# Patient Record
Sex: MALE | Race: WHITE | NOT HISPANIC OR LATINO | Employment: STUDENT | URBAN - METROPOLITAN AREA
[De-identification: names, ages, dates, MRNs, and addresses within clinical notes are randomized per-mention and may not be internally consistent; named-entity substitution may affect disease eponyms.]

---

## 2021-05-27 ENCOUNTER — RECORDS - HEALTHEAST (OUTPATIENT)
Dept: ADMINISTRATIVE | Facility: CLINIC | Age: 21
End: 2021-05-27

## 2022-12-28 ENCOUNTER — HOSPITAL ENCOUNTER (EMERGENCY)
Facility: HOSPITAL | Age: 22
Discharge: HOME | End: 2022-12-28
Attending: STUDENT IN AN ORGANIZED HEALTH CARE EDUCATION/TRAINING PROGRAM | Admitting: STUDENT IN AN ORGANIZED HEALTH CARE EDUCATION/TRAINING PROGRAM
Payer: COMMERCIAL

## 2022-12-28 ENCOUNTER — APPOINTMENT (OUTPATIENT)
Dept: CT IMAGING | Facility: HOSPITAL | Age: 22
End: 2022-12-28
Attending: STUDENT IN AN ORGANIZED HEALTH CARE EDUCATION/TRAINING PROGRAM
Payer: COMMERCIAL

## 2022-12-28 VITALS
TEMPERATURE: 98.7 F | HEART RATE: 65 BPM | WEIGHT: 160 LBS | RESPIRATION RATE: 16 BRPM | OXYGEN SATURATION: 97 % | SYSTOLIC BLOOD PRESSURE: 106 MMHG | HEIGHT: 70 IN | BODY MASS INDEX: 22.9 KG/M2 | DIASTOLIC BLOOD PRESSURE: 57 MMHG

## 2022-12-28 DIAGNOSIS — S09.90XA INJURY OF HEAD, INITIAL ENCOUNTER: ICD-10-CM

## 2022-12-28 PROCEDURE — 250N000013 HC RX MED GY IP 250 OP 250 PS 637: Performed by: STUDENT IN AN ORGANIZED HEALTH CARE EDUCATION/TRAINING PROGRAM

## 2022-12-28 PROCEDURE — 250N000011 HC RX IP 250 OP 636: Performed by: STUDENT IN AN ORGANIZED HEALTH CARE EDUCATION/TRAINING PROGRAM

## 2022-12-28 PROCEDURE — 70450 CT HEAD/BRAIN W/O DYE: CPT

## 2022-12-28 PROCEDURE — 99284 EMERGENCY DEPT VISIT MOD MDM: CPT | Mod: 25

## 2022-12-28 PROCEDURE — 72125 CT NECK SPINE W/O DYE: CPT

## 2022-12-28 RX ORDER — ONDANSETRON 4 MG/1
4 TABLET, ORALLY DISINTEGRATING ORAL EVERY 8 HOURS PRN
Qty: 10 TABLET | Refills: 0 | Status: SHIPPED | OUTPATIENT
Start: 2022-12-28

## 2022-12-28 RX ORDER — ACETAMINOPHEN 325 MG/1
650 TABLET ORAL ONCE
Status: COMPLETED | OUTPATIENT
Start: 2022-12-28 | End: 2022-12-28

## 2022-12-28 RX ORDER — ONDANSETRON 4 MG/1
4 TABLET, ORALLY DISINTEGRATING ORAL ONCE
Status: COMPLETED | OUTPATIENT
Start: 2022-12-28 | End: 2022-12-28

## 2022-12-28 RX ADMIN — ACETAMINOPHEN 650 MG: 325 TABLET ORAL at 20:26

## 2022-12-28 RX ADMIN — ONDANSETRON 4 MG: 4 TABLET, ORALLY DISINTEGRATING ORAL at 20:26

## 2022-12-28 ASSESSMENT — ENCOUNTER SYMPTOMS
SHORTNESS OF BREATH: 0
NUMBNESS: 0
NAUSEA: 1
VOMITING: 0
HEADACHES: 1
WEAKNESS: 0
ABDOMINAL PAIN: 0

## 2022-12-28 ASSESSMENT — ACTIVITIES OF DAILY LIVING (ADL): ADLS_ACUITY_SCORE: 35

## 2022-12-29 NOTE — DISCHARGE INSTRUCTIONS
Thankfully your imaging today was reassuring  Please follow up closely with your clinic provider    You can take 650 mg - 1000 mg of tylenol every 6-8 hours (no more than 4000 mg in 24 hours).  You can take 600 mg ofibuprofen with food every 6-8 hours (no more than 3200 mg in 24 hours).   Can take zofran as needed for nausea    Return to the Emergency Room if continual vomiting, changes in vision, focal numbness/weakness, chest pain, abdominal pain, difficulty breathing or other worsening symptoms

## 2022-12-29 NOTE — ED TRIAGE NOTES
Slipped on ice and fell backwards down deck stairs (roughly eight steps). Fall happened at 4 pm today. Patient complaints of pain in the back of head, nausea without vomiting and headache. No blood thinners. Here with parents.      Triage Assessment     Row Name 12/28/22 1950       Triage Assessment (Adult)    Airway WDL WDL       Respiratory WDL    Respiratory WDL WDL       Skin Circulation/Temperature WDL    Skin Circulation/Temperature WDL WDL       Cardiac WDL    Cardiac WDL WDL       Peripheral/Neurovascular WDL    Peripheral Neurovascular WDL WDL       Cognitive/Neuro/Behavioral WDL    Cognitive/Neuro/Behavioral WDL WDL

## 2022-12-29 NOTE — ED PROVIDER NOTES
NAME: Michael Britt  AGE: 22 year old male  YOB: 2000  MRN: [id number]  EVALUATION DATE & TIME: 2022  7:56 PM    PCP: Edgardo Mckeon  ED PROVIDER: Agnes Elliott MD.    Chief Complaint   Patient presents with     Fall     Head Injury       FINAL IMPRESSION:  1. Injury of head, initial encounter        MEDICAL DECISION MAKIN:080 PM I met with the patient, obtained history, performed an initial exam, and discussed options and plan for diagnostics and treatment here in the ED.     MDM: 21 y/o M who presents with head injury.  Patient reports he had a mechanical fall down some steps and hit the back of his head.  Since then he has had nausea, photophobia, headache.  No neurologic deficits on exam. CT of the head and C-spine without acute findings.  No midline spinal tenderness.  History and exam not suggestive of significant chest or abdominal trauma warm teen images.  He is otherwise healthy and had no symptoms preceding the fall I do not think he needs EKG, labs or further medical work-up for the cause of fall.  I suspect he has a concussion.  He was given Zofran and Tylenol with some improvement.  Recommend he follow-up in clinic within the next week for reassessment and further management of his symptoms. Strict return precautions discussed and patient is in agreement with plan, endorses understanding and their questions were answered.      MEDICATIONS GIVEN IN THE EMERGENCY:  Medications   acetaminophen (TYLENOL) tablet 650 mg (650 mg Oral Given 22)   ondansetron (ZOFRAN ODT) ODT tab 4 mg (4 mg Oral Given 22)       NEW PRESCRIPTIONS STARTED AT TODAY'S ER VISIT:  Discharge Medication List as of 2022  9:17 PM      START taking these medications    Details   ondansetron (ZOFRAN ODT) 4 MG ODT tab Take 1 tablet (4 mg) by mouth every 8 hours as needed for nausea or vomiting, Disp-10 tablet, R-0, Local Print             "  =================================================================  HPI    Patient information was obtained from: Patient and parents  Use of : N/A       Michael Britt is a 22 year old male with a limited past medical history who presents with headache post fall.     Patient reports earlier this evening he was walking down icy steps when he slipped and hit his posterior head. Initially saw \"stars\" and had bilateral blurred vision which has since resolved. Endorses some nausea while looking at screens and a current headache. Denies feeling lightheaded before fall.     Denies use of pain medication.     Denies vomiting, chest pain, abdominal pain, numbness, weakness, and shortness of breath.       REVIEW OF SYSTEMS   Review of Systems   Eyes: Positive for visual disturbance (Resolved).   Respiratory: Negative for shortness of breath.    Cardiovascular: Negative for chest pain.   Gastrointestinal: Positive for nausea. Negative for abdominal pain and vomiting.   Neurological: Positive for headaches. Negative for weakness and numbness.   All other systems reviewed and are negative.       PAST MEDICAL HISTORY:  No past medical history on file.    PAST SURGICAL HISTORY:  No past surgical history on file.    CURRENT MEDICATIONS:    No current facility-administered medications for this encounter.    Current Outpatient Medications:      ondansetron (ZOFRAN ODT) 4 MG ODT tab, Take 1 tablet (4 mg) by mouth every 8 hours as needed for nausea or vomiting, Disp: 10 tablet, Rfl: 0    ALLERGIES:  No Known Allergies    FAMILY HISTORY:  No family history on file.    SOCIAL HISTORY:   Social History     Socioeconomic History     Marital status: Single       PHYSICAL EXAM:    Vitals: /57   Pulse 65   Temp 98.7  F (37.1  C) (Temporal)   Resp 16   Ht 1.778 m (5' 10\")   Wt 72.6 kg (160 lb)   SpO2 97%   BMI 22.96 kg/m     Constitutional: Well developed, well nourished. Comfortable appearing.   HENT: Small bump to " posterior scalp. Mucous membranes moist, nose midline  Eyes: PERRL, mid-range pupils, conjunctiva normal.  Neck: No midline cervical spine tenderness. Normal ROM  Respiratory: CTAB. Normal work of breathing.   Cardiovascular: Regular rate and rhythm.  Musculoskeletal: No focal bony tenderness to extremities or deformities  Abdomen: Soft. Non-tender in all quadrants. No guarding.  Back: No midline C,T,L tenderness or deformity. No abrasions or bony stepoffs.  Neurologic: Alert & oriented x 3, cranial nerves grossly intact. Normal gross coordination. Strength/sensation intact in extremities  Psychiatric: Affect normal, cooperative.       LAB:  All pertinent labs reviewed and interpreted.  Labs Ordered and Resulted from Time of ED Arrival to Time of ED Departure - No data to display    RADIOLOGY:  Cervical spine CT w/o contrast   Final Result   IMPRESSION:   HEAD CT:   1.  No acute intracranial process.      CERVICAL SPINE CT:   1.  No CT evidence for acute fracture or post traumatic subluxation.      Head CT w/o contrast   Final Result   IMPRESSION:   HEAD CT:   1.  No acute intracranial process.      CERVICAL SPINE CT:   1.  No CT evidence for acute fracture or post traumatic subluxation.          PROCEDURES:   Procedures     I, Penny Partida, am serving as a scribe to document services personally performed by Dr. Agnes Elliott based on my observation and the provider's statements to me. I, Agnes Elliott MD attest that Penny Partida is acting in a scribe capacity, has observed my performance of the services and has documented them in accordance with my direction.    Agnes Elliott M.D.  Emergency Medicine  [facility] EMERGENCY DEPARTMENT  [facility] [facility] [address]  [phone]  Dept: [phone]       Agnes Elliott MD  12/28/22 7323